# Patient Record
Sex: MALE | Race: BLACK OR AFRICAN AMERICAN | NOT HISPANIC OR LATINO | Employment: FULL TIME | URBAN - METROPOLITAN AREA
[De-identification: names, ages, dates, MRNs, and addresses within clinical notes are randomized per-mention and may not be internally consistent; named-entity substitution may affect disease eponyms.]

---

## 2025-06-21 ENCOUNTER — OFFICE VISIT (OUTPATIENT)
Dept: URGENT CARE | Age: 30
End: 2025-06-21
Payer: COMMERCIAL

## 2025-06-21 ENCOUNTER — APPOINTMENT (OUTPATIENT)
Dept: URGENT CARE | Age: 30
End: 2025-06-21
Payer: COMMERCIAL

## 2025-06-21 VITALS
BODY MASS INDEX: 49.44 KG/M2 | SYSTOLIC BLOOD PRESSURE: 130 MMHG | RESPIRATION RATE: 19 BRPM | HEART RATE: 98 BPM | TEMPERATURE: 98.8 F | DIASTOLIC BLOOD PRESSURE: 82 MMHG | WEIGHT: 315 LBS | OXYGEN SATURATION: 97 % | HEIGHT: 67 IN

## 2025-06-21 DIAGNOSIS — J03.90 ACUTE TONSILLITIS, UNSPECIFIED ETIOLOGY: ICD-10-CM

## 2025-06-21 LAB
POC HUMAN RHINOVIRUS PCR: NEGATIVE
POC INFLUENZA A VIRUS PCR: NEGATIVE
POC INFLUENZA B VIRUS PCR: NEGATIVE
POC RAPID MONO: NEGATIVE
POC RESPIRATORY SYNCYTIAL VIRUS PCR: NEGATIVE
POC STREPTOCOCCUS PYOGENES (GROUP A STREP) PCR: NEGATIVE

## 2025-06-21 RX ORDER — CHOLECALCIFEROL (VITAMIN D3) 25 MCG
1000 TABLET ORAL
COMMUNITY
Start: 2025-06-05

## 2025-06-21 RX ORDER — ERGOCALCIFEROL 1.25 MG/1
50000 CAPSULE ORAL
COMMUNITY
Start: 2025-04-10

## 2025-06-21 RX ORDER — PREDNISONE 20 MG/1
20 TABLET ORAL DAILY
Qty: 3 TABLET | Refills: 0 | Status: SHIPPED | OUTPATIENT
Start: 2025-06-21 | End: 2025-06-24

## 2025-06-21 RX ORDER — PREDNISONE 20 MG/1
20 TABLET ORAL DAILY
Qty: 3 TABLET | Refills: 0 | Status: SHIPPED | OUTPATIENT
Start: 2025-06-21 | End: 2025-06-21

## 2025-06-21 RX ORDER — AMOXICILLIN AND CLAVULANATE POTASSIUM 875; 125 MG/1; MG/1
875 TABLET, FILM COATED ORAL 2 TIMES DAILY
Qty: 14 TABLET | Refills: 0 | Status: SHIPPED | OUTPATIENT
Start: 2025-06-21 | End: 2025-06-28

## 2025-06-21 RX ORDER — AMOXICILLIN AND CLAVULANATE POTASSIUM 875; 125 MG/1; MG/1
875 TABLET, FILM COATED ORAL 2 TIMES DAILY
Qty: 14 TABLET | Refills: 0 | Status: SHIPPED | OUTPATIENT
Start: 2025-06-21 | End: 2025-06-21

## 2025-06-21 RX ORDER — CYCLOBENZAPRINE HCL 10 MG
10 TABLET ORAL 3 TIMES DAILY PRN
COMMUNITY
Start: 2025-05-10

## 2025-06-21 ASSESSMENT — PAIN SCALES - GENERAL: PAINLEVEL_OUTOF10: 6

## 2025-06-21 NOTE — PROGRESS NOTES
"Subjective   Patient ID: Adolfo Cintron is a 29 y.o. male. They present today with a chief complaint of Sore Throat (Onset 3 days , hard to swallow ).    History of Present Illness  Patient reports symptoms present for ~3 days  Endorses sore throat/pain with swallowing  Denies fever  Slight cough  Denies sinus pain/pressure  No known strep contacts  Denies ear pain  Denies sharing drinks  No known mono exposures  Notes white spot along his tonsil  Reports that he snores at nighttime  Denies jaw pain  Denies drooling  Denies pain with neck extension    Past Medical History  Allergies as of 06/21/2025    (No Known Allergies)       Prescriptions Prior to Admission[1]     Medical History[2]    Surgical History[3]     reports that he has been smoking cigarettes. He has never used smokeless tobacco. He reports current alcohol use. He reports that he does not use drugs.                               Objective    Vitals:    06/21/25 1722   BP: 130/82   BP Location: Right arm   Patient Position: Sitting   BP Cuff Size: Adult   Pulse: 98   Resp: 19   Temp: 37.1 °C (98.8 °F)   TempSrc: Oral   SpO2: 97%   Weight: 143 kg (315 lb)   Height: 1.702 m (5' 7\")     No LMP for male patient.    Physical Exam  Constitutional:       General: He is not in acute distress.     Appearance: Normal appearance. He is not toxic-appearing or diaphoretic.   HENT:      Right Ear: Tympanic membrane, ear canal and external ear normal. There is no impacted cerumen.      Left Ear: Tympanic membrane, ear canal and external ear normal. There is no impacted cerumen.      Nose: No rhinorrhea.      Mouth/Throat:      Pharynx: Posterior oropharyngeal erythema present.      Tonsils: Tonsillar exudate (left tonsil) present.      Comments: Enlarged tonsils bilaterally  Uvula midline  Eyes:      General: No scleral icterus.        Right eye: No discharge.         Left eye: No discharge.      Extraocular Movements: Extraocular movements intact.   Pulmonary:      " Effort: Pulmonary effort is normal. No respiratory distress.   Musculoskeletal:      Cervical back: Normal range of motion.   Neurological:      Mental Status: He is alert.   Psychiatric:         Mood and Affect: Mood normal.         Behavior: Behavior normal.         Thought Content: Thought content normal.      Comments: Pleasant         Procedures    Point of Care Test & Imaging Results from this visit:  Results for orders placed or performed in visit on 06/21/25   POCT SPOTFIRE R/ST Panel Mini w/Strep A ([facility]) manually resulted    Collection Time: 06/21/25  5:47 PM   Result Value Ref Range    POC Group A Strep, PCR Negative Negative    POC Respiratory Syncytial Virus PCR Negative Negative    POC Influenza A Virus PCR Negative Negative    POC Influenza B Virus PCR Negative Negative    POC Human Rhinovirus PCR Negative Negative   POCT Infectious mononucleosis antibody manually resulted    Collection Time: 06/21/25  6:19 PM   Result Value Ref Range    POC Rapid Mono Negative Negative       Diagnostic study results (if any) were reviewed by Cain Wood MD.    Assessment/Plan   Allergies, medications, history, and pertinent labs/EKGs/Imaging reviewed by Cain Wood MD.     Medical Decision Making:    Patient has exudative tonsillitis on exam. Strep spotfire & mono are neg. AFVSS satting 97% on RA. Denies drooling, jaw pain, neck pain with extension etc. Through shared decision making, will order prednisone & Augmentin. ER if symptoms worsen.     Orders and Diagnoses  Diagnoses and all orders for this visit:  Acute tonsillitis, unspecified etiology  -     POCT SPOTFIRE R/ST Panel Mini w/Strep A ([facility]) manually resulted  -     POCT Infectious mononucleosis antibody manually resulted  -     amoxicillin-clavulanate (Augmentin) 875-125 mg tablet; Take 1 tablet by mouth 2 times a day for 7 days.  -     predniSONE (Deltasone) 20 mg tablet; Take 1 tablet (20 mg) by mouth once daily for 3 days. Take in  the morning with food      Patient disposition: Home      Medical Admin Record      Follow Up Instructions  No follow-ups on file.    Electronically signed by Cain Wood MD  6:20 PM             [1] (Not in a hospital admission)   [2] No past medical history on file.  [3] No past surgical history on file.